# Patient Record
Sex: FEMALE | Race: WHITE | NOT HISPANIC OR LATINO | Employment: FULL TIME | URBAN - METROPOLITAN AREA
[De-identification: names, ages, dates, MRNs, and addresses within clinical notes are randomized per-mention and may not be internally consistent; named-entity substitution may affect disease eponyms.]

---

## 2024-11-08 ENCOUNTER — APPOINTMENT (OUTPATIENT)
Dept: GENERAL RADIOLOGY | Facility: HOSPITAL | Age: 34
End: 2024-11-08
Payer: MEDICAID

## 2024-11-08 ENCOUNTER — HOSPITAL ENCOUNTER (EMERGENCY)
Facility: HOSPITAL | Age: 34
Discharge: HOME | End: 2024-11-08
Payer: MEDICAID

## 2024-11-08 VITALS
HEART RATE: 76 BPM | SYSTOLIC BLOOD PRESSURE: 121 MMHG | DIASTOLIC BLOOD PRESSURE: 76 MMHG | OXYGEN SATURATION: 99 % | BODY MASS INDEX: 36.8 KG/M2 | WEIGHT: 200 LBS | HEIGHT: 62 IN | RESPIRATION RATE: 18 BRPM | TEMPERATURE: 98.1 F

## 2024-11-08 DIAGNOSIS — M54.50 LOW BACK PAIN, UNSPECIFIED BACK PAIN LATERALITY, UNSPECIFIED CHRONICITY, UNSPECIFIED WHETHER SCIATICA PRESENT: Primary | ICD-10-CM

## 2024-11-08 LAB
B-HCG UR QL: NEGATIVE
BACTERIA UR QL AUTO: NORMAL /HPF
BILIRUB UR QL STRIP: NEGATIVE
CLARITY UR: CLEAR
COLOR UR: YELLOW
GLUCOSE UR STRIP-MCNC: NEGATIVE MG/DL
HGB UR QL STRIP.AUTO: ABNORMAL
HYALINE CASTS UR QL AUTO: NORMAL /LPF
KETONES UR QL STRIP: NEGATIVE
LEUKOCYTE ESTERASE UR QL STRIP.AUTO: NEGATIVE
NITRITE UR QL STRIP: NEGATIVE
PH UR STRIP.AUTO: 6.5 [PH] (ref 5–8)
PROT UR QL STRIP: NEGATIVE
RBC # UR STRIP: NORMAL /HPF
REF LAB TEST METHOD: NORMAL
SP GR UR STRIP: <=1.005 (ref 1–1.03)
SQUAMOUS #/AREA URNS HPF: NORMAL /HPF
UROBILINOGEN UR QL STRIP: ABNORMAL
WBC # UR STRIP: NORMAL /HPF

## 2024-11-08 PROCEDURE — 72110 X-RAY EXAM L-2 SPINE 4/>VWS: CPT

## 2024-11-08 PROCEDURE — 99283 EMERGENCY DEPT VISIT LOW MDM: CPT

## 2024-11-08 PROCEDURE — 96372 THER/PROPH/DIAG INJ SC/IM: CPT

## 2024-11-08 PROCEDURE — 81001 URINALYSIS AUTO W/SCOPE: CPT

## 2024-11-08 PROCEDURE — 25010000002 METHYLPREDNISOLONE PER 125 MG

## 2024-11-08 PROCEDURE — 81025 URINE PREGNANCY TEST: CPT

## 2024-11-08 RX ORDER — PREDNISONE 20 MG/1
20 TABLET ORAL DAILY
Qty: 5 TABLET | Refills: 0 | Status: SHIPPED | OUTPATIENT
Start: 2024-11-08 | End: 2024-11-13

## 2024-11-08 RX ORDER — OXYCODONE HYDROCHLORIDE 5 MG/1
5 TABLET ORAL ONCE
Status: COMPLETED | OUTPATIENT
Start: 2024-11-08 | End: 2024-11-08

## 2024-11-08 RX ORDER — TRAMADOL HYDROCHLORIDE 50 MG/1
50 TABLET ORAL EVERY 6 HOURS PRN
Qty: 12 TABLET | Refills: 0 | Status: SHIPPED | OUTPATIENT
Start: 2024-11-08

## 2024-11-08 RX ORDER — METHYLPREDNISOLONE SODIUM SUCCINATE 125 MG/2ML
80 INJECTION, POWDER, LYOPHILIZED, FOR SOLUTION INTRAMUSCULAR; INTRAVENOUS ONCE
Status: COMPLETED | OUTPATIENT
Start: 2024-11-08 | End: 2024-11-08

## 2024-11-08 RX ADMIN — METHYLPREDNISOLONE SODIUM SUCCINATE 80 MG: 125 INJECTION, POWDER, FOR SOLUTION INTRAMUSCULAR; INTRAVENOUS at 18:36

## 2024-11-08 RX ADMIN — OXYCODONE 5 MG: 5 TABLET ORAL at 18:37

## 2024-11-08 NOTE — Clinical Note
[facility] EMERGENCY DEPARTMENT  [address] [place] [address]  Phone: [phone]    Ila Ocasio was seen and treated in our emergency department on 11/8/2024.  She may return to work on 11/09/2024.         Thank you for choosing [facility].    Barbara Kuo APRN

## 2024-11-08 NOTE — ED PROVIDER NOTES
Subjective   History of Present Illness  Patient is a 34-year-old female who presents with left lower back pain that radiated into bilateral lower extremities.  She denies any injury or trauma, reports history of sciatica.  Reports the pain is worse on the left than the right.  Patient denies any saddle anesthesia, loss of bowel or bladder, or paresthesias into extremities.        Review of Systems   Constitutional:  Negative for fever.       No past medical history on file.    Allergies   Allergen Reactions    Hydrocodone Hives    Meloxicam Other (See Comments)     Blisters.         No past surgical history on file.    No family history on file.    Social History     Socioeconomic History    Marital status:            Objective   Physical Exam  Vitals and nursing note reviewed.   Constitutional:       Appearance: Normal appearance.   HENT:      Head: Normocephalic and atraumatic.      Nose: Nose normal.      Mouth/Throat:      Mouth: Mucous membranes are moist.   Eyes:      Extraocular Movements: Extraocular movements intact.   Pulmonary:      Effort: Pulmonary effort is normal.   Abdominal:      Palpations: Abdomen is soft.      Tenderness: There is no abdominal tenderness.   Musculoskeletal:         General: Tenderness present. No swelling or signs of injury.      Cervical back: Normal range of motion.      Comments: Midline and bilateral musculoskeletal lumbar tenderness noted.  No crepitus or step-off noted.  No tenderness noted to cervical or thoracic spine.  Negative right straight leg test.  Positive left straight leg test at 10 degrees.  Strength is 5/5.  Pulses present bilaterally, and neurovascularly intact bilaterally.       Skin:     General: Skin is warm and dry.      Capillary Refill: Capillary refill takes less than 2 seconds.   Neurological:      General: No focal deficit present.      Mental Status: She is alert and oriented to person, place, and time.   Psychiatric:         Mood and Affect:  "Mood normal.         Behavior: Behavior normal.         Procedures           ED Course                    /76   Pulse 76   Temp 98.1 °F (36.7 °C) (Oral)   Resp 18   Ht 157.5 cm (62\")   Wt 90.7 kg (200 lb)   LMP 10/24/2024   SpO2 99%   BMI 36.58 kg/m²   Labs Reviewed   URINALYSIS W/ MICROSCOPIC IF INDICATED (NO CULTURE) - Abnormal; Notable for the following components:       Result Value    Blood, UA Trace (*)     All other components within normal limits   PREGNANCY, URINE - Normal   URINALYSIS, MICROSCOPIC ONLY     Medications   oxyCODONE (ROXICODONE) immediate release tablet 5 mg (5 mg Oral Given 11/8/24 1837)   methylPREDNISolone sodium succinate (SOLU-Medrol) injection 80 mg (80 mg Intramuscular Given 11/8/24 1836)     XR Spine Lumbar Complete 4+VW    Result Date: 11/8/2024  Impression: Mild multilevel lumbar spondylosis without acute radiographic abnormality. Electronically Signed: Jordin Heredia MD  11/8/2024 7:24 PM EST  Workstation ID: [id number]                             Medical Decision Making  Radiology interpretation:  X-rays reviewed and interpreted by Fabian: Negative for fracture.  Mild multilevel lumbar spondylosis without acute radiographic abnormality.  Further interpretation by radiologist as above  Lab interpretation:  Labs all viewed by me and significant for: Urine-trace blood, WBC none seen,bacteria none seen, nitrite negative, leukocyte negative, protein negative.  Urine pregnancy negative.    EKG considered but was not emergently warranted at this time.      Urine and scans were obtained to evaluate for UTI, fracture, stenosis.  On initial examination patient was nontoxic in appearance with no signs and symptoms of distress, but was tearful.  Patient reports she has a history of sciatica on the left side, but states that this pain is even worse than her sciatica flares.  Patient reports midline and musculoskeletal lumbar pain left worse than right, that she states radiates " down posterior bilateral lower extremities to her feet.  Patient denies any saddle anesthesia, loss of bowel or bladder, or paresthesias in her bilateral lower extremities.  Patient had negative right straight leg test but did have a positive left straight leg test at approximately 10 degrees.  Strength was 5/5 bilaterally.  Pulses present bilaterally and neurovascularly intact. X-ray of lumbar spine was ordered.  CT and MRI were considered but due to lack of signs and symptoms of cauda equina they were not emergently warranted at this time.  Patient was given IM Solu-Medrol and Roxicodone.  On reexamination patient continued to be nontoxic in appearance with no signs and symptoms of distress.  Patient reports the pain is much better and she is able to get up and move around.  She reports she is self-pay, due to her lack of insurance at this time.  Spoke with registration provided patient Medicaid application.  Inspect was checked, gabapentin 300 mg quantity 30, filled on 5/2/2023, tramadol 50 mg quantity 10, sold on 1/25/2023.  Prescribed prednisone and tramadol.  Discussed findings and decision to discharge.  Advised patient to follow-up with primary care as needed, take medications as prescribed, and activities as tolerated.  Provided work note to return tomorrow.  Patient was agreeable to discharge and verbalized understanding of all discharge instructions.    Appropriate PPE worn during exam.    i discussed findings with patient who voices understanding of discharge instructions, signs and symptoms requiring return to ED; discharged improved and in stable condition with follow up for re-evaluation.  This document is intended for medical expert use only. Reading of this document by patients and/or patient's family without participating medical staff guidance may result in misinterpretation and unintended morbidity.  Any interpretation of such data is the responsibility of the patient and/or family member  responsible for the patient in concert with their primary or specialist providers, not to be left for sources of online searches such as [employer], [employer] or similar queries. Relying on these approaches to knowledge may result in misinterpretation, misguided goals of care and even death should patients or family members try recommendations outside of the realm of professional medical care in a supervised inpatient environment.       Problems Addressed:  Low back pain, unspecified back pain laterality, unspecified chronicity, unspecified whether sciatica present: complicated acute illness or injury    Amount and/or Complexity of Data Reviewed  Radiology: ordered.    Risk  Prescription drug management.        Final diagnoses:   Low back pain, unspecified back pain laterality, unspecified chronicity, unspecified whether sciatica present       ED Disposition  ED Disposition       ED Disposition   Discharge    Condition   Stable    Comment   --               Aylin Luciano, APRN  [address]  [place] [address] [phone]    Call   As needed         Medication List        New Prescriptions      predniSONE 20 MG tablet  Commonly known as: DELTASONE  Take 1 tablet by mouth Daily for 5 days.     traMADol 50 MG tablet  Commonly known as: ULTRAM  Take 1 tablet by mouth Every 6 (Six) Hours As Needed for Moderate Pain.               Where to Get Your Medications        These medications were sent to [facility]/pharmacy #[id number] - [place], [place] - [address] AT [address] [phone]  - [phone] [address] [place] [address]      Phone: [phone]   predniSONE 20 MG tablet  traMADol 50 MG tablet            Barbara Kuo, KRISTY  11/08/24 0731

## 2024-11-09 NOTE — DISCHARGE INSTRUCTIONS
Rest.  Activities as tolerated.  Take medications as prescribed.  Follow-up with primary care as needed.  Return to the ER for any new or worsening symptoms.